# Patient Record
Sex: MALE | Race: WHITE | Employment: FULL TIME | URBAN - METROPOLITAN AREA
[De-identification: names, ages, dates, MRNs, and addresses within clinical notes are randomized per-mention and may not be internally consistent; named-entity substitution may affect disease eponyms.]

---

## 2018-07-12 ENCOUNTER — OFFICE VISIT (OUTPATIENT)
Dept: FAMILY MEDICINE CLINIC | Age: 23
End: 2018-07-12
Payer: COMMERCIAL

## 2018-07-12 VITALS
BODY MASS INDEX: 31.52 KG/M2 | DIASTOLIC BLOOD PRESSURE: 78 MMHG | WEIGHT: 208 LBS | SYSTOLIC BLOOD PRESSURE: 128 MMHG | HEART RATE: 84 BPM | HEIGHT: 68 IN

## 2018-07-12 DIAGNOSIS — Z00.00 ENCOUNTER FOR MEDICAL EXAMINATION TO ESTABLISH CARE: Primary | ICD-10-CM

## 2018-07-12 PROCEDURE — 99385 PREV VISIT NEW AGE 18-39: CPT | Performed by: FAMILY MEDICINE

## 2018-07-12 ASSESSMENT — ENCOUNTER SYMPTOMS
COUGH: 0
ABDOMINAL PAIN: 0
DIARRHEA: 0
CONSTIPATION: 0
EYES NEGATIVE: 1
SHORTNESS OF BREATH: 0

## 2018-07-12 ASSESSMENT — PATIENT HEALTH QUESTIONNAIRE - PHQ9
2. FEELING DOWN, DEPRESSED OR HOPELESS: 0
SUM OF ALL RESPONSES TO PHQ9 QUESTIONS 1 & 2: 0
SUM OF ALL RESPONSES TO PHQ QUESTIONS 1-9: 0
1. LITTLE INTEREST OR PLEASURE IN DOING THINGS: 0

## 2018-07-12 NOTE — PROGRESS NOTES
[facility]  [facility]  [address] [facility]  [address] [facility] [address]  Dept: [phone]    7/12/2018    CHIEF COMPLAINT    Chief Complaint   Patient presents with   [address]    Annual Exam       HPI    Todd Bond is a 26 y.o. male who presents   Chief Complaint   Patient presents with   [address] [facility] Annual Exam   .  Physical exam. NP to get established. Had normal vaccines growing up. No specific health concerns. Currently a student at [employer]. Not exercising regularly but plays tennis when school is in session. REVIEW OF SYSTEMS    Review of Systems   Constitutional: Negative for fever, malaise/fatigue and weight loss. Weight fluctuates   HENT: Negative. Eyes: Negative. Respiratory: Negative for cough and shortness of breath. Cardiovascular: Negative for chest pain, palpitations and leg swelling. Gastrointestinal: Negative for abdominal pain, constipation and diarrhea. Genitourinary: Negative for frequency and urgency. Musculoskeletal: Negative. Skin: Negative. Neurological: Negative for dizziness, sensory change and headaches. Endo/Heme/Allergies: Negative. Psychiatric/Behavioral: Negative for depression. The patient is not nervous/anxious and does not have insomnia. PAST MEDICAL HISTORY    History reviewed. No pertinent past medical history.     FAMILY HISTORY    Family History   Problem Relation Age of Onset    Asthma Mother     High Cholesterol Father        SOCIAL HISTORY    Social History     Social History    Marital status: Single     Spouse name: N/A    Number of children: N/A    Years of education: N/A     Occupational History    [employer]      Social History Main Topics    Smoking status: Never Smoker    Smokeless tobacco: Never Used    Alcohol use Yes      Comment: rarely    Drug use: No    Sexual activity: Yes     Partners: Female     Other Topics Concern   

## 2019-05-30 ENCOUNTER — OFFICE VISIT (OUTPATIENT)
Dept: FAMILY MEDICINE CLINIC | Age: 24
End: 2019-05-30
Payer: COMMERCIAL

## 2019-05-30 VITALS
WEIGHT: 215 LBS | HEIGHT: 68 IN | BODY MASS INDEX: 32.58 KG/M2 | HEART RATE: 80 BPM | DIASTOLIC BLOOD PRESSURE: 76 MMHG | SYSTOLIC BLOOD PRESSURE: 124 MMHG

## 2019-05-30 DIAGNOSIS — R05.8 COUGH PRODUCTIVE OF CLEAR SPUTUM: Primary | ICD-10-CM

## 2019-05-30 DIAGNOSIS — J30.1 SEASONAL ALLERGIC RHINITIS DUE TO POLLEN: ICD-10-CM

## 2019-05-30 PROCEDURE — 1036F TOBACCO NON-USER: CPT | Performed by: FAMILY MEDICINE

## 2019-05-30 PROCEDURE — G8417 CALC BMI ABV UP PARAM F/U: HCPCS | Performed by: FAMILY MEDICINE

## 2019-05-30 PROCEDURE — 99213 OFFICE O/P EST LOW 20 MIN: CPT | Performed by: FAMILY MEDICINE

## 2019-05-30 PROCEDURE — G8427 DOCREV CUR MEDS BY ELIG CLIN: HCPCS | Performed by: FAMILY MEDICINE

## 2019-05-30 RX ORDER — MONTELUKAST SODIUM 10 MG/1
10 TABLET ORAL NIGHTLY
Qty: 30 TABLET | Refills: 5 | Status: SHIPPED | OUTPATIENT
Start: 2019-05-30 | End: 2019-11-04 | Stop reason: ALTCHOICE

## 2019-05-30 ASSESSMENT — ENCOUNTER SYMPTOMS
ABDOMINAL PAIN: 0
COUGH: 1
WHEEZING: 1
SHORTNESS OF BREATH: 0
EYES NEGATIVE: 1

## 2019-05-30 ASSESSMENT — PATIENT HEALTH QUESTIONNAIRE - PHQ9
SUM OF ALL RESPONSES TO PHQ9 QUESTIONS 1 & 2: 0
1. LITTLE INTEREST OR PLEASURE IN DOING THINGS: 0
2. FEELING DOWN, DEPRESSED OR HOPELESS: 0
SUM OF ALL RESPONSES TO PHQ QUESTIONS 1-9: 0
SUM OF ALL RESPONSES TO PHQ QUESTIONS 1-9: 0

## 2019-05-30 NOTE — PATIENT INSTRUCTIONS
Patient Education        Managing Your Allergies: Care Instructions  Your Care Instructions    Managing your allergies is an important part of staying healthy. Your doctor will help you find out what may be causing the allergies. Common causes of allergy symptoms are house dust and dust mites, animal dander, mold, and pollen. As soon as you know what triggers your symptoms, try to reduce your exposure to your triggers. This can help prevent allergy symptoms, asthma, and other health problems. Ask your doctor about allergy medicine or immunotherapy. These treatments may help reduce or prevent allergy symptoms. Follow-up care is a key part of your treatment and safety. Be sure to make and go to all appointments, and call your doctor if you are having problems. It's also a good idea to know your test results and keep a list of the medicines you take. How can you care for yourself at home? · If you think that dust or dust mites are causing your allergies:  ? Wash sheets, pillowcases, and other bedding every week in hot water. ? Use airtight, dust-proof covers for pillows, duvets, and mattresses. Avoid plastic covers, because they tend to tear quickly and do not \"breathe. \" Wash according to the instructions. ? Remove extra blankets and pillows that you don't need. ? Use blankets that are machine-washable. ? Don't use home humidifiers. They can help mites live longer. · Use air-conditioning. Change or clean all filters every month. Keep windows closed. Use high-efficiency air filters. Don't use window or attic fans, which draw dust into the air. · If you're allergic to pet dander, keep pets outside or, at the very least, out of your bedroom. Old carpet and cloth-covered furniture can hold a lot of animal dander. You may need to replace them. · Look for signs of cockroaches. Use cockroach baits to get rid of them. Then clean your home well.   · If you're allergic to mold, don't keep indoor plants, because molds can grow in soil. Get rid of furniture, rugs, and drapes that smell musty. Check for mold in the bathroom. · If you're allergic to pollen, stay inside when pollen counts are high. · Don't smoke or let anyone else smoke in your house. Don't use fireplaces or wood-burning stoves. Avoid paint fumes, perfumes, and other strong odors. When should you call for help? Give an epinephrine shot if:    · You think you are having a severe allergic reaction.    After giving an epinephrine shot call 911, even if you feel better.   Call 911 if:    · You have symptoms of a severe allergic reaction. These may include:  ? Sudden raised, red areas (hives) all over your body. ? Swelling of the throat, mouth, lips, or tongue. ? Trouble breathing. ? Passing out (losing consciousness). Or you may feel very lightheaded or suddenly feel weak, confused, or restless.     · You have been given an epinephrine shot, even if you feel better.    Call your doctor now or seek immediate medical care if:    · You have symptoms of an allergic reaction, such as:  ? A rash or hives (raised, red areas on the skin). ? Itching. ? Swelling. ? Belly pain, nausea, or vomiting.    Watch closely for changes in your health, and be sure to contact your doctor if:    · Your allergies get worse.     · You need help controlling your allergies.     · You have questions about allergy testing.     · You do not get better as expected. Where can you learn more? Go to [email or web address] org and sign in to your Hark account. Enter L249 in the [facility] box to learn more about \"Managing Your Allergies: Care Instructions. \"     If you do not have an account, please click on the \"Sign Up Now\" link. Current as of: June 27, 2018  Content Version: 12.0  © 4567-1358 Healthwise, Incorporated. Care instructions adapted under license by [facility] ([facility]).  If you have questions about a medical condition or this instruction, always ask doctor if you have:  · asthma, or a history of severe allergic reaction to aspirin. The chewable tablet may contain phenylalanine. Talk to your doctor before using this form of montelukast if you have phenylketonuria (PKU). Montelukast is not expected to be harmful to an unborn baby. Tell your doctor if you are pregnant or plan to become pregnant during treatment. It is not known whether montelukast passes into breast milk or if it could harm a nursing baby. Tell your doctor if you are breast-feeding a baby. How should I take montelukast?  Follow all directions on your prescription label. Do not take this medicine in larger or smaller amounts or for longer than recommended. Montelukast is usually taken once daily in the evening for prevention of asthma or allergy symptoms. For exercise-induced bronchoconstriction, take a single dose at least 2 hours before you exercise, and do not take another dose for at least 24 hours. Follow your doctor's instructions. Montelukast is not a rescue medicine. It will not work fast enough to treat an asthma attack. Use only a fast acting inhalation medicine for an asthma attack. Tell your doctor if it seems like your asthma medications don't work as well. Swallow the regular tablet  whole, with a glass of water. The chewable tablet  must be chewed completely before you swallow it. The oral granules  can be placed directly into the mouth and swallowed, or mixed with a spoonful of applesauce, mashed carrots, rice, or ice cream. Oral granules can also be mixed with 1 teaspoon of baby formula or breast milk. Do not use any other type of liquid for mixing the granules. Other liquids can be taken before or after taking the medicine. After opening or mixing the oral granules, you must use them within 15 minutes. Do not save an open packet or mixed medicine for later use. It may take up to several weeks before your symptoms improve.  Keep using the medication as directed and tell your doctor if your symptoms do not improve after several weeks of treatment. If you also take a steroid asthma medicine, do not stop using it suddenly without first talking to your doctor. You may need to use less and less before you stop the medication completely. Asthma is usually treated with a combination of drugs. Use all medications as directed by your doctor. Read the medication guide or patient instructions provided with each medication. Do not change your doses or medication schedule without your doctor's advice, even if you have no asthma symptoms. Store at room temperature away from moisture and heat. Do not open a packet of oral granules until you are ready to use the medicine. What happens if I miss a dose? Take the missed dose as soon as you remember. Skip the missed dose if it is almost time for your next scheduled dose. Do not  take extra medicine to make up the missed dose. What happens if I overdose? Seek emergency medical attention or call the Poison Help line at 1-516.616.3957. What should I avoid while taking montelukast?  Avoid situations or activities that may trigger an asthma attack. If your asthma symptoms get worse when you take aspirin, avoid taking aspirin or other NSAIDs (nonsteroidal anti-inflammatory drugs) while you are taking montelukast. NSAIDs include ibuprofen (Advil, Motrin), naproxen (Aleve), celecoxib, diclofenac, indomethacin, meloxicam, and others. What are the possible side effects of montelukast?  Get emergency medical help if you have signs of an allergic reaction:  hives; difficulty breathing; swelling of your face, lips, tongue, or throat.   Call your doctor at once if you have:  · unusual changes in mood or behavior;  · skin rash, bruising, severe tingling, numbness, pain, muscle weakness;  · ear pain, swelling, or warmth; or  · severe skin reaction --fever, sore throat, swelling in your face or tongue, burning in your eyes, skin pain, followed by a red or purple skin rash that spreads (especially in the face or upper body) and causes blistering and peeling. Common side effects may include:  · stomach pain, diarrhea;  · fever or other flu symptoms;  · cold symptoms such as stuffy nose, sinus pain, cough, sore throat;  · headache; or  · bed-wetting or loss of bladder control in children. This is not a complete list of side effects and others may occur. Call your doctor for medical advice about side effects. You may report side effects to FDA at 4-824-FDA-9918. What other drugs will affect montelukast?  Other drugs may interact with montelukast, including prescription and over-the-counter medicines, vitamins, and herbal products. Tell each of your health care providers about all medicines you use now and any medicine you start or stop using. Where can I get more information? Your pharmacist can provide more information about montelukast.  Remember, keep this and all other medicines out of the reach of children, never share your medicines with others, and use this medication only for the indication prescribed. Every effort has been made to ensure that the information provided by [address] is accurate, up-to-date, and complete, but no guarantee is made to that effect. Drug information contained herein may be time sensitive. [employer] information has been compiled for use by healthcare practitioners and consumers in the [place] and therefore "[employer] does not warrant that uses outside of the [place] are appropriate, unless specifically indicated otherwise. [facility]'s drug information does not endorse drugs, diagnose patients or recommend therapy.  [employer] drug information is an informational resource designed to assist licensed healthcare practitioners in caring for their patients and/or to serve consumers viewing this service as a supplement to, and not a substitute for, the expertise, skill, knowledge and judgment of healthcare

## 2019-05-30 NOTE — PROGRESS NOTES
[facility] & [facility]  [facility]  [address] [facility]  [address] [facility] [address]  Dept: [phone]    5/30/2019    CHIEF COMPLAINT    Chief Complaint   Patient presents with    Cough    Mass     face       HPI    Janette Mcclure is a 22 y.o. male who presents   Chief Complaint   Patient presents with    Cough    Mass     face   . Has a cough every year, usually in the winter. Current cough started 5-6 weeks ago. Raised lesion left cheek for past year, not changing. Cough   This is a recurrent problem. The current episode started more than 1 month ago. The problem has been waxing and waning. The problem occurs hourly. The cough is productive of sputum. Associated symptoms include postnasal drip and wheezing (mild, occasional). Pertinent negatives include no chest pain, fever, headaches or shortness of breath. The symptoms are aggravated by pollens. He has tried OTC cough suppressant for the symptoms. The treatment provided mild relief. His past medical history is significant for environmental allergies. There is no history of asthma. Vitals:    05/30/19 0805   BP: 124/76   Pulse: 80   Weight: 215 lb (97.5 kg)   Height: 5' 8\" (1.727 m)       REVIEW OF SYSTEMS    Review of Systems   Constitutional: Negative for fever. HENT: Positive for postnasal drip. Eyes: Negative. Respiratory: Positive for cough and wheezing (mild, occasional). Negative for shortness of breath. Cardiovascular: Negative for chest pain and leg swelling. Gastrointestinal: Negative for abdominal pain. Genitourinary: Negative for frequency and urgency. Musculoskeletal: Negative. Skin: Negative. Allergic/Immunologic: Positive for environmental allergies. Neurological: Negative for dizziness and headaches. Psychiatric/Behavioral: The patient is not nervous/anxious.         PAST MEDICAL HISTORY    Past Medical History:   Diagnosis Date    Seasonal allergic rhinitis due to pollen 5/30/2019       FAMILY HISTORY    Family History   Problem Relation Age of Onset    Asthma Mother     High Cholesterol Father        SOCIAL HISTORY    Social History     Socioeconomic History    Marital status: Single     Spouse name: None    Number of children: None    Years of education: None    Highest education level: None   Occupational History    Occupation: [employer]   Social Needs    Financial resource strain: None    Food insecurity:     Worry: None     Inability: None    Transportation needs:     Medical: None     Non-medical: None   Tobacco Use    Smoking status: Never Smoker    Smokeless tobacco: Never Used   Substance and Sexual Activity    Alcohol use: Yes     Comment: rarely    Drug use: No    Sexual activity: Yes     Partners: Female   Lifestyle    Physical activity:     Days per week: None     Minutes per session: None    Stress: None   Relationships    Social connections:     Talks on phone: None     Gets together: None     Attends Yarsanism service: None     Active member of club or organization: None     Attends meetings of clubs or organizations: None     Relationship status: None    Intimate partner violence:     Fear of current or ex partner: None     Emotionally abused: None     Physically abused: None     Forced sexual activity: None   Other Topics Concern    None   Social History Narrative    None       SURGICAL HISTORY    History reviewed. No pertinent surgical history. CURRENT MEDICATIONS    Current Outpatient Medications   Medication Sig Dispense Refill    montelukast (SINGULAIR) 10 MG tablet Take 1 tablet by mouth nightly 30 tablet 5     No current facility-administered medications for this visit. ALLERGIES    No Known Allergies    PHYSICAL EXAM   Physical Exam   Constitutional: He is oriented to person, place, and time. He appears well-developed and well-nourished. HENT:   Head: Normocephalic. Nose: Mucosal edema present.  Right sinus exhibits no maxillary sinus tenderness and no frontal sinus tenderness. Left sinus exhibits no maxillary sinus tenderness and no frontal sinus tenderness. Mouth/Throat: Mucous membranes are normal. Posterior oropharyngeal edema present. Eyes: Pupils are equal, round, and reactive to light. Conjunctivae are normal.   Neck: Normal range of motion. Neck supple. No thyromegaly present. Cardiovascular: Normal rate, regular rhythm and normal heart sounds. No murmur heard. Pulmonary/Chest: Effort normal and breath sounds normal. He has no wheezes. He has no rales. Abdominal: Soft. There is no tenderness. There is no rebound and no guarding. Musculoskeletal: Normal range of motion. He exhibits no edema, tenderness or deformity. Lymphadenopathy:     He has no cervical adenopathy. Neurological: He is alert and oriented to person, place, and time. Skin: Skin is warm and dry. Psychiatric: He has a normal mood and affect. His behavior is normal.   Vitals reviewed. Assessment/PLan  1. Seasonal allergic rhinitis due to pollen  Take otc antihistamine. Saline spray prn  - montelukast (SINGULAIR) 10 MG tablet; Take 1 tablet by mouth nightly  Dispense: 30 tablet; Refill: 5    2. Cough productive of clear sputum  As above. Call if worse or not improving. Hydrate. Adam Gutting received counseling on the following healthy behaviors: nutrition, exercise and medication adherence  Reviewed prior labs and health maintenance. Continue current medications, diet and exercise. Discussed use, benefit, and side effects of prescribed medications. Barriers to medication compliance addressed. Patient given educational materials - see patient instructions. All patient questions answered. Patient voiced understanding. Return if symptoms worsen or fail to improve.         Electronically signed by Elia Gaytan MD on 5/30/19 at 8:11 AM

## 2019-11-04 ENCOUNTER — HOSPITAL ENCOUNTER (OUTPATIENT)
Age: 24
Setting detail: SPECIMEN
Discharge: HOME | End: 2019-11-04
Payer: COMMERCIAL

## 2019-11-04 ENCOUNTER — OFFICE VISIT (OUTPATIENT)
Dept: FAMILY MEDICINE CLINIC | Age: 24
End: 2019-11-04
Payer: COMMERCIAL

## 2019-11-04 VITALS
TEMPERATURE: 99 F | HEART RATE: 100 BPM | DIASTOLIC BLOOD PRESSURE: 78 MMHG | WEIGHT: 220.5 LBS | BODY MASS INDEX: 33.53 KG/M2 | SYSTOLIC BLOOD PRESSURE: 126 MMHG

## 2019-11-04 DIAGNOSIS — J35.8 ASYMMETRIC TONSILS: ICD-10-CM

## 2019-11-04 DIAGNOSIS — J35.01 CHRONIC TONSILLITIS: Primary | ICD-10-CM

## 2019-11-04 DIAGNOSIS — J35.01 CHRONIC TONSILLITIS: ICD-10-CM

## 2019-11-04 LAB — S PYO AG THROAT QL: NORMAL

## 2019-11-04 PROCEDURE — 1036F TOBACCO NON-USER: CPT | Performed by: FAMILY MEDICINE

## 2019-11-04 PROCEDURE — 99213 OFFICE O/P EST LOW 20 MIN: CPT | Performed by: FAMILY MEDICINE

## 2019-11-04 PROCEDURE — G8484 FLU IMMUNIZE NO ADMIN: HCPCS | Performed by: FAMILY MEDICINE

## 2019-11-04 PROCEDURE — G8417 CALC BMI ABV UP PARAM F/U: HCPCS | Performed by: FAMILY MEDICINE

## 2019-11-04 PROCEDURE — 87880 STREP A ASSAY W/OPTIC: CPT | Performed by: FAMILY MEDICINE

## 2019-11-04 PROCEDURE — G8427 DOCREV CUR MEDS BY ELIG CLIN: HCPCS | Performed by: FAMILY MEDICINE

## 2019-11-04 ASSESSMENT — ENCOUNTER SYMPTOMS
NAUSEA: 0
EYES NEGATIVE: 1
SORE THROAT: 1
SWOLLEN GLANDS: 1
COUGH: 0

## 2019-11-06 LAB
CULTURE: NORMAL
Lab: NORMAL
SPECIMEN DESCRIPTION: NORMAL

## 2020-01-16 ENCOUNTER — OFFICE VISIT (OUTPATIENT)
Dept: FAMILY MEDICINE CLINIC | Age: 25
End: 2020-01-16
Payer: COMMERCIAL

## 2020-01-16 VITALS
TEMPERATURE: 99.3 F | WEIGHT: 225 LBS | DIASTOLIC BLOOD PRESSURE: 86 MMHG | BODY MASS INDEX: 34.1 KG/M2 | HEART RATE: 100 BPM | SYSTOLIC BLOOD PRESSURE: 128 MMHG | HEIGHT: 68 IN

## 2020-01-16 PROCEDURE — 99213 OFFICE O/P EST LOW 20 MIN: CPT | Performed by: FAMILY MEDICINE

## 2020-01-16 RX ORDER — CYCLOBENZAPRINE HCL 10 MG
10 TABLET ORAL 3 TIMES DAILY PRN
Qty: 21 TABLET | Refills: 0 | Status: SHIPPED | OUTPATIENT
Start: 2020-01-16 | End: 2020-01-26

## 2020-01-16 RX ORDER — METHYLPREDNISOLONE 4 MG/1
TABLET ORAL
Qty: 1 KIT | Refills: 0 | Status: SHIPPED | OUTPATIENT
Start: 2020-01-16 | End: 2020-06-02

## 2020-01-16 ASSESSMENT — PATIENT HEALTH QUESTIONNAIRE - PHQ9
2. FEELING DOWN, DEPRESSED OR HOPELESS: 0
1. LITTLE INTEREST OR PLEASURE IN DOING THINGS: 0
SUM OF ALL RESPONSES TO PHQ QUESTIONS 1-9: 0
SUM OF ALL RESPONSES TO PHQ QUESTIONS 1-9: 0
SUM OF ALL RESPONSES TO PHQ9 QUESTIONS 1 & 2: 0

## 2020-01-16 ASSESSMENT — ENCOUNTER SYMPTOMS
COUGH: 1
BACK PAIN: 1

## 2020-01-16 NOTE — LETTER
[address] [facility]  [address] [address]  Phone: [phone]  Fax: [phone] [address] Sandra Hernandez MD        January 16, 2020     Patient: Jack Pulido   YOB: 1995   Date of Visit: 1/16/2020       To Whom It May Concern: It is my medical opinion that Jack Pulido should remain out of work until 1/19/2020. Patient is ok to returm to work on 1/20/2020. If you have any questions or concerns, please don't hesitate to call.     Sincerely,        [address] Sandra Hernandez MD

## 2020-01-16 NOTE — PROGRESS NOTES
[facility]  [facility]  [address]  [facility] [address]  Dept: [phone]    1/16/2020    CHIEF COMPLAINT    Chief Complaint   Patient presents with    Back Pain    Generalized Body Aches       HPI    Colt Mckeon is a 22 y.o. male who presents   Chief Complaint   Patient presents with    Back Pain    Generalized Body Aches   . Back Pain   This is a new problem. The current episode started in the past 7 days. The problem occurs constantly. The pain is present in the lumbar spine. The quality of the pain is described as aching. The pain does not radiate. The symptoms are aggravated by bending. He has tried NSAIDs, ice and heat for the symptoms. The treatment provided mild relief. Generalized Body Aches   This is a new problem. The current episode started yesterday. Associated symptoms include arthralgias, congestion, coughing, fatigue and myalgias. The symptoms are aggravated by bending. Vitals:    01/16/20 1135   BP: 128/86   Pulse: 100   Temp: 99.3 °F (37.4 °C)   Weight: 225 lb (102.1 kg)   Height: 5' 8\" (1.727 m)       REVIEW OF SYSTEMS    Review of Systems   Constitutional: Positive for fatigue. HENT: Positive for congestion. Respiratory: Positive for cough. Musculoskeletal: Positive for arthralgias, back pain and myalgias.        PAST MEDICAL HISTORY    Past Medical History:   Diagnosis Date    Seasonal allergic rhinitis due to pollen 5/30/2019       FAMILY HISTORY    Family History   Problem Relation Age of Onset    Asthma Mother     High Cholesterol Father        SOCIAL HISTORY    Social History     Socioeconomic History    Marital status: Single     Spouse name: None    Number of children: None    Years of education: None    Highest education level: None   Occupational History    Occupation: [employer]   Social Needs    Financial resource strain: None    Food insecurity:     Worry: None     Inability:

## 2020-06-02 ENCOUNTER — NURSE TRIAGE (OUTPATIENT)
Dept: OTHER | Facility: CLINIC | Age: 25
End: 2020-06-02

## 2020-06-02 ENCOUNTER — OFFICE VISIT (OUTPATIENT)
Dept: PRIMARY CARE CLINIC | Age: 25
End: 2020-06-02
Payer: COMMERCIAL

## 2020-06-02 VITALS
HEART RATE: 94 BPM | BODY MASS INDEX: 34.1 KG/M2 | DIASTOLIC BLOOD PRESSURE: 88 MMHG | OXYGEN SATURATION: 98 % | HEIGHT: 68 IN | SYSTOLIC BLOOD PRESSURE: 120 MMHG | RESPIRATION RATE: 18 BRPM | WEIGHT: 225 LBS

## 2020-06-02 PROCEDURE — 99213 OFFICE O/P EST LOW 20 MIN: CPT | Performed by: NURSE PRACTITIONER

## 2020-06-02 RX ORDER — AMOXICILLIN AND CLAVULANATE POTASSIUM 875; 125 MG/1; MG/1
1 TABLET, FILM COATED ORAL 2 TIMES DAILY
Qty: 20 TABLET | Refills: 0 | Status: SHIPPED | OUTPATIENT
Start: 2020-06-02 | End: 2020-06-12

## 2020-06-02 RX ORDER — CETIRIZINE HYDROCHLORIDE 10 MG/1
10 TABLET ORAL DAILY
Qty: 14 TABLET | Refills: 0 | Status: SHIPPED | OUTPATIENT
Start: 2020-06-02 | End: 2020-06-16

## 2020-06-02 ASSESSMENT — ENCOUNTER SYMPTOMS
SHORTNESS OF BREATH: 0
VOMITING: 0
COUGH: 0
SORE THROAT: 0
COLOR CHANGE: 0
CHEST TIGHTNESS: 0
ABDOMINAL PAIN: 0
DIARRHEA: 0
RHINORRHEA: 0
NAUSEA: 0

## 2020-06-02 NOTE — PATIENT INSTRUCTIONS
changes in your health, and be sure to contact your doctor if:  · You have new or worse symptoms. · You are not getting better after taking an antibiotic for 2 days. Where can you learn more? Go to [email or web address]. [email or web address] and sign in to your LoggedIn account. Enter Y012 in the [employer] box to learn more about \"Ear Infection (Otitis Media): Care Instructions. \"     If you do not have an account, please click on the \"Sign Up Now\" link. Current as of: July 29, 2019               Content Version: 12.5  © 1178-0585 Healthwise, Incorporated. Care instructions adapted under license by [facility] ([facility]). If you have questions about a medical condition or this instruction, always ask your healthcare professional. [address] any warranty or liability for your use of this information.

## 2020-06-11 ENCOUNTER — OFFICE VISIT (OUTPATIENT)
Dept: PRIMARY CARE CLINIC | Age: 25
End: 2020-06-11
Payer: COMMERCIAL

## 2020-06-11 VITALS
BODY MASS INDEX: 35.73 KG/M2 | DIASTOLIC BLOOD PRESSURE: 70 MMHG | RESPIRATION RATE: 12 BRPM | WEIGHT: 235 LBS | OXYGEN SATURATION: 98 % | HEART RATE: 92 BPM | SYSTOLIC BLOOD PRESSURE: 118 MMHG

## 2020-06-11 PROCEDURE — 99213 OFFICE O/P EST LOW 20 MIN: CPT | Performed by: NURSE PRACTITIONER

## 2020-06-11 RX ORDER — FLUTICASONE PROPIONATE 50 MCG
2 SPRAY, SUSPENSION (ML) NASAL DAILY
Qty: 1 BOTTLE | Refills: 1 | Status: SHIPPED | OUTPATIENT
Start: 2020-06-11 | End: 2020-10-21

## 2020-06-11 RX ORDER — CETIRIZINE HYDROCHLORIDE, PSEUDOEPHEDRINE HYDROCHLORIDE 5; 120 MG/1; MG/1
1 TABLET, FILM COATED, EXTENDED RELEASE ORAL 2 TIMES DAILY
Qty: 14 TABLET | Refills: 0 | Status: SHIPPED | OUTPATIENT
Start: 2020-06-11 | End: 2020-06-18

## 2020-06-11 ASSESSMENT — ENCOUNTER SYMPTOMS
SWOLLEN GLANDS: 0
SINUS PAIN: 0
RHINORRHEA: 0
TROUBLE SWALLOWING: 0
COUGH: 0
COLOR CHANGE: 0
SINUS PRESSURE: 1
VOMITING: 0
ABDOMINAL PAIN: 0
HOARSE VOICE: 0
SHORTNESS OF BREATH: 0
NAUSEA: 0
DIARRHEA: 0
SORE THROAT: 0
CHEST TIGHTNESS: 0

## 2020-06-11 NOTE — PROGRESS NOTES
and neck pain. Skin: Negative for color change. Neurological: Negative for weakness and headaches. Psychiatric/Behavioral: Negative for behavioral problems. The patient is not nervous/anxious. All other systems reviewed and are negative. Objective:   /70 (Site: Right Upper Arm, Position: Sitting)   Pulse 92   Resp 12   Wt 235 lb (106.6 kg)   SpO2 98%   BMI 35.73 kg/m²   Physical Exam  Vitals signs reviewed. Constitutional:       General: He is not in acute distress. Appearance: Normal appearance. HENT:      Head: Normocephalic. Right Ear: Tympanic membrane, ear canal and external ear normal.      Left Ear: Tympanic membrane, ear canal and external ear normal.      Nose: Nose normal.      Right Sinus: No maxillary sinus tenderness or frontal sinus tenderness. Left Sinus: No maxillary sinus tenderness or frontal sinus tenderness. Mouth/Throat:      Pharynx: Oropharynx is clear. Eyes:      Pupils: Pupils are equal, round, and reactive to light. Neck:      Musculoskeletal: Normal range of motion and neck supple. Cardiovascular:      Rate and Rhythm: Normal rate and regular rhythm. Pulses: Normal pulses. Heart sounds: Normal heart sounds. Pulmonary:      Effort: Pulmonary effort is normal. No respiratory distress. Breath sounds: Normal breath sounds. No wheezing or rhonchi. Abdominal:      General: Bowel sounds are normal.   Musculoskeletal: Normal range of motion. Lymphadenopathy:      Cervical: No cervical adenopathy. Skin:     General: Skin is warm and dry. Capillary Refill: Capillary refill takes less than 2 seconds. Neurological:      Mental Status: He is alert and oriented to person, place, and time. Psychiatric:         Mood and Affect: Mood normal.         Behavior: Behavior normal.         Thought Content: Thought content normal.         Judgment: Judgment normal.           :       Diagnosis Orders   1.  Sinus congestion

## 2020-06-11 NOTE — PATIENT INSTRUCTIONS
hot, wet towel or a warm gel pack on your face 3 or 4 times a day for 5 to 10 minutes each time. · Try a decongestant nasal spray like oxymetazoline (Afrin). Do not use it for more than 3 days in a row. Using it for more than 3 days can make your congestion worse. When should you call for help? Call your doctor now or seek immediate medical care if:  · You have new or worse swelling or redness in your face or around your eyes. · You have a new or higher fever. Watch closely for changes in your health, and be sure to contact your doctor if:  · You have new or worse facial pain. · The mucus from your nose becomes thicker (like pus) or has new blood in it. · You are not getting better as expected. Where can you learn more? Go to [email or web address] and sign in to your BoardVitals account. Enter X496 in the Metafused box to learn more about \"Sinusitis: Care Instructions. \"     If you do not have an account, please click on the \"Sign Up Now\" link. Current as of: July 29, 2019               Content Version: 12.5  © 0852-5434 Healthwise, Incorporated. Care instructions adapted under license by [facility] ([facility]). If you have questions about a medical condition or this instruction, always ask your healthcare professional. [address] any warranty or liability for your use of this information.

## 2020-06-19 ENCOUNTER — OFFICE VISIT (OUTPATIENT)
Dept: PRIMARY CARE CLINIC | Age: 25
End: 2020-06-19
Payer: COMMERCIAL

## 2020-06-19 VITALS
TEMPERATURE: 98 F | SYSTOLIC BLOOD PRESSURE: 126 MMHG | DIASTOLIC BLOOD PRESSURE: 76 MMHG | WEIGHT: 234.2 LBS | OXYGEN SATURATION: 98 % | BODY MASS INDEX: 35.61 KG/M2 | HEART RATE: 98 BPM | RESPIRATION RATE: 16 BRPM

## 2020-06-19 PROCEDURE — 96372 THER/PROPH/DIAG INJ SC/IM: CPT | Performed by: INTERNAL MEDICINE

## 2020-06-19 PROCEDURE — 99213 OFFICE O/P EST LOW 20 MIN: CPT | Performed by: INTERNAL MEDICINE

## 2020-06-19 RX ORDER — METHYLPREDNISOLONE ACETATE 40 MG/ML
40 INJECTION, SUSPENSION INTRA-ARTICULAR; INTRALESIONAL; INTRAMUSCULAR; SOFT TISSUE ONCE
Status: COMPLETED | OUTPATIENT
Start: 2020-06-19 | End: 2020-06-19

## 2020-06-19 RX ORDER — PREDNISONE 20 MG/1
TABLET ORAL
Qty: 18 TABLET | Refills: 0 | Status: SHIPPED | OUTPATIENT
Start: 2020-06-19 | End: 2020-06-29

## 2020-06-19 RX ORDER — TIZANIDINE 4 MG/1
4 TABLET ORAL NIGHTLY PRN
Qty: 20 TABLET | Refills: 0 | Status: SHIPPED | OUTPATIENT
Start: 2020-06-19 | End: 2020-10-21 | Stop reason: ALTCHOICE

## 2020-06-19 RX ADMIN — METHYLPREDNISOLONE ACETATE 40 MG: 40 INJECTION, SUSPENSION INTRA-ARTICULAR; INTRALESIONAL; INTRAMUSCULAR; SOFT TISSUE at 15:33

## 2020-06-27 ASSESSMENT — ENCOUNTER SYMPTOMS
ABDOMINAL PAIN: 0
SINUS PRESSURE: 0
BACK PAIN: 1
VOMITING: 0
WHEEZING: 0
NAUSEA: 0
CONSTIPATION: 0
SORE THROAT: 0
ABDOMINAL DISTENTION: 0
COUGH: 0
SHORTNESS OF BREATH: 0

## 2020-06-28 NOTE — PROGRESS NOTES
[facility] PRIMARY CARE  [facility] [address] [facility] [address]  [address] [address]  Dept: [phone]  Dept Fax: [phone]    Nidia Saab is a 26 y.o. male who presents today for his medical conditions/complaints as noted below. Chief Complaint   Patient presents with    Back Pain     Mid X 4 days and muscle spasms       HPI:     This is a 80-year-old male who is here for mid back pain. This has been going on for past 4 days and there muscle spasm this started after lifting some heavy weights. No other complaints or concerns associated. No results found for: LABA1C          ( goal A1C is < 7)   No results found for: LABMICR  No results found for: LDLCHOLESTEROL, LDLCALC    (goal LDL is <100)   No results found for: AST, ALT, BUN  BP Readings from Last 3 Encounters:   20 126/76   20 118/70   20 120/88          (goal 120/80)    Past Medical History:   Diagnosis Date    Seasonal allergic rhinitis due to pollen 2019      History reviewed. No pertinent surgical history. Family History   Problem Relation Age of Onset    Asthma Mother     High Cholesterol Father        Social History     Tobacco Use    Smoking status: Never Smoker    Smokeless tobacco: Never Used   Substance Use Topics    Alcohol use: Yes     Comment: rarely      Current Outpatient Medications   Medication Sig Dispense Refill    predniSONE (DELTASONE) 20 MG tablet 3 tabs x 3 days, then 2 tabs x 3 days, then 1 tab x 3 days 18 tablet 0    tiZANidine (ZANAFLEX) 4 MG tablet Take 1 tablet by mouth nightly as needed (muscle spasm) 20 tablet 0    fluticasone (FLONASE) 50 MCG/ACT nasal spray 2 sprays by Each Nostril route daily 1 Bottle 1     No current facility-administered medications for this visit.       No Known Allergies    Health Maintenance   Topic Date Due    HPV vaccine (1 - Male 2-dose series) 2007    Varicella vaccine (2 of 2 - 13+ 2-dose series) 2010    Trachea: Phonation normal.   Cardiovascular:      Rate and Rhythm: Normal rate and regular rhythm. Pulses: Normal pulses. Carotid pulses are 2+ on the right side and 2+ on the left side. Radial pulses are 2+ on the right side and 2+ on the left side. Heart sounds: Normal heart sounds. No murmur. Pulmonary:      Effort: Pulmonary effort is normal. No accessory muscle usage or respiratory distress. Breath sounds: Normal breath sounds. No wheezing or rales. Abdominal:      General: Bowel sounds are normal. There is no distension. Palpations: Abdomen is soft. Tenderness: There is no abdominal tenderness. There is no rebound. Musculoskeletal: Normal range of motion. General: No deformity. Lymphadenopathy:      Cervical: No cervical adenopathy. Skin:     General: Skin is warm. Capillary Refill: Capillary refill takes less than 2 seconds. Findings: No rash. Nails: There is no clubbing. Neurological:      Mental Status: He is alert and oriented to person, place, and time. Sensory: No sensory deficit. Psychiatric:         Speech: Speech normal.         Behavior: Behavior normal.       /76   Pulse 98   Temp 98 °F (36.7 °C) (Temporal)   Resp 16   Wt 234 lb 3.2 oz (106.2 kg)   SpO2 98%   BMI 35.61 kg/m²     Assessment:          1. Spasm of thoracic back muscle    - predniSONE (DELTASONE) 20 MG tablet; 3 tabs x 3 days, then 2 tabs x 3 days, then 1 tab x 3 days  Dispense: 18 tablet; Refill: 0  - tiZANidine (ZANAFLEX) 4 MG tablet; Take 1 tablet by mouth nightly as needed (muscle spasm)  Dispense: 20 tablet; Refill: 0  - methylPREDNISolone acetate (DEPO-MEDROL) injection 40 mg            Diagnosis Orders   1.  Spasm of thoracic back muscle  predniSONE (DELTASONE) 20 MG tablet    tiZANidine (ZANAFLEX) 4 MG tablet    methylPREDNISolone acetate (DEPO-MEDROL) injection 40 mg           Plan:      Return in about 4 weeks (around 7/17/2020) for

## 2020-10-21 ENCOUNTER — OFFICE VISIT (OUTPATIENT)
Dept: PRIMARY CARE CLINIC | Age: 25
End: 2020-10-21
Payer: COMMERCIAL

## 2020-10-21 VITALS
BODY MASS INDEX: 36.04 KG/M2 | SYSTOLIC BLOOD PRESSURE: 114 MMHG | WEIGHT: 237 LBS | HEART RATE: 103 BPM | DIASTOLIC BLOOD PRESSURE: 64 MMHG | TEMPERATURE: 97.7 F | OXYGEN SATURATION: 97 %

## 2020-10-21 PROCEDURE — 99214 OFFICE O/P EST MOD 30 MIN: CPT | Performed by: PHYSICIAN ASSISTANT

## 2020-10-21 RX ORDER — AMOXICILLIN AND CLAVULANATE POTASSIUM 875; 125 MG/1; MG/1
1 TABLET, FILM COATED ORAL 2 TIMES DAILY
Qty: 20 TABLET | Refills: 0 | Status: SHIPPED | OUTPATIENT
Start: 2020-10-21 | End: 2020-10-31

## 2020-10-21 RX ORDER — CETIRIZINE HYDROCHLORIDE, PSEUDOEPHEDRINE HYDROCHLORIDE 5; 120 MG/1; MG/1
1 TABLET, FILM COATED, EXTENDED RELEASE ORAL 2 TIMES DAILY
Qty: 20 TABLET | Refills: 0 | Status: SHIPPED | OUTPATIENT
Start: 2020-10-21 | End: 2020-11-20

## 2020-10-21 ASSESSMENT — ENCOUNTER SYMPTOMS
RHINORRHEA: 1
BACK PAIN: 0
SINUS PRESSURE: 1
VOMITING: 0
ABDOMINAL PAIN: 0
EYE PAIN: 0
NAUSEA: 0
DIARRHEA: 0
SHORTNESS OF BREATH: 0
SINUS PAIN: 1
COUGH: 1
CONSTIPATION: 0

## 2020-10-21 NOTE — PROGRESS NOTES
[address] [place] [address] CARE  [facility] [address]  [address] [address]  Dept: [phone]  Dept Fax: [phone]    Jv Cee is a 26 y.o. male who presents today for his medical conditions/complaints as noted below. Chief Complaint   Patient presents with    Cough     deep phlegmy cough, makes him have trouble breathing, this happens every october to march, post nasal drip making him nauseated       HPI:     Patient presents to the office for evaluation of uri symptoms. These symptoms have been present for around 3 weeks. He admits to wet cough, sinus pressure/pain, ear pressure, post nasal drainage. Denies shortness of breath, chest pain, abdominal pain, nausea, muscle aches, fever, chills. This is a recurrent problem for the patient. He describes several years worth of upper respiratory difficulties which are related to the change of fall season. He was seen by [facility] in June and treated for similar symptoms. He says his symptoms are worse now. He does have history of seasonal allergies. No diagnosis of asthma. No prior ENT or allergist.      No results found for: LABA1C          ( goal A1C is < 7)   No results found for: LABMICR  No results found for: LDLCHOLESTEROL, LDLCALC    (goal LDL is <100)   No results found for: AST, ALT, BUN  BP Readings from Last 3 Encounters:   10/21/20 114/64   06/19/20 126/76   06/11/20 118/70          (goal 120/80)    Past Medical History:   Diagnosis Date    Seasonal allergic rhinitis due to pollen 5/30/2019      History reviewed. No pertinent surgical history.     Family History   Problem Relation Age of Onset    Asthma Mother     High Cholesterol Father        Social History     Tobacco Use    Smoking status: Never Smoker    Smokeless tobacco: Never Used   Substance Use Topics    Alcohol use: Yes     Comment: rarely      Current Outpatient Medications   Medication Sig Dispense Refill    amoxicillin-clavulanate Left Ear: External ear normal. No decreased hearing noted. A middle ear effusion is present. There is no impacted cerumen. No mastoid tenderness. Nose: Congestion present. Mouth/Throat:      Mouth: Mucous membranes are moist.      Pharynx: No posterior oropharyngeal erythema. Tonsils: No tonsillar exudate. 2+ on the right. 2+ on the left. Eyes:      Extraocular Movements: Extraocular movements intact. Conjunctiva/sclera: Conjunctivae normal.      Pupils: Pupils are equal, round, and reactive to light. Neck:      Musculoskeletal: Normal range of motion. Cardiovascular:      Rate and Rhythm: Normal rate and regular rhythm. Pulses: Normal pulses. Heart sounds: Normal heart sounds. Pulmonary:      Effort: Pulmonary effort is normal.      Breath sounds: Rhonchi present. Abdominal:      General: Abdomen is flat. Bowel sounds are normal.      Palpations: Abdomen is soft. Tenderness: There is no abdominal tenderness. Musculoskeletal:      Right lower leg: No edema. Left lower leg: No edema. Lymphadenopathy:      Cervical: No cervical adenopathy. Skin:     General: Skin is warm. Capillary Refill: Capillary refill takes less than 2 seconds. Neurological:      General: No focal deficit present. Mental Status: He is alert and oriented to person, place, and time. Psychiatric:         Mood and Affect: Mood normal.         Behavior: Behavior normal.       /64 (Site: Left Upper Arm, Position: Sitting)   Pulse 103   Temp 97.7 °F (36.5 °C)   Wt 237 lb (107.5 kg)   SpO2 97%   BMI 36.04 kg/m²     Assessment:       ICD-10-CM    1. Upper respiratory tract infection, unspecified type  J06.9 amoxicillin-clavulanate (AUGMENTIN) 875-125 MG per tablet     cetirizine-psuedoephedrine (ZYRTEC-D) 5-120 MG per extended release tablet   2. Recurrent sinus infections  J32.9 CARL - Gilberto Brewer MD, Otolaryngology, [address] [place] [address]:       1.  Patient given Rx for Augmentin and Zyrtec-D for sinus/upper respiratory infection. I encouraged him to push fluids and use nasal saline rinses. 2. Patient requests a referral to ENT for his chronic sinus issues. Return if symptoms worsen or fail to improve. Orders Placed This Encounter   Procedures   Margaret Rocha MD, Otolaryngology, [place]     Referral Priority:   Routine     Referral Type:   Eval and Treat     Referral Reason:   Specialty Services Required     Referred to Provider:   Bri Jones MD     Requested Specialty:   Otolaryngology     Number of Visits Requested:   1         Patient given educational materials - see patient instructions. Discussed use, benefit, and side effects of prescribedmedications. All patient questions answered. Pt voiced understanding. Reviewed health maintenance. Instructed to continue current medications, diet and exercise. Patient agreed with treatment plan. Follow up as directed.         Electronically signed by Abbie Harris PA-C on 10/21/2020 at 1:07 PM

## 2020-10-21 NOTE — LETTER
[facility] Primary Care  [address] [facility]  Phone: [phone]  Fax: [address] [place], [place]        October 21, 2020     Patient: Candelario Flores   YOB: 1995   Date of Visit: 10/21/2020       To Whom It May Concern: It is my medical opinion that Candelario Flores may return to work on 10/22/2020. If you have any questions or concerns, please don't hesitate to call.     Sincerely,        Germán Schneider PA-C

## 2021-02-01 ENCOUNTER — TELEPHONE (OUTPATIENT)
Dept: PRIMARY CARE CLINIC | Age: 26
End: 2021-02-01

## 2021-02-01 NOTE — TELEPHONE ENCOUNTER
Left message asking patient if he would like to schedule a physical appt with provider, hasn't had one and last appt 6 mo ago with Monica Odonnell

## 2022-07-21 ENCOUNTER — OFFICE VISIT (OUTPATIENT)
Dept: FAMILY MEDICINE CLINIC | Age: 27
End: 2022-07-21
Payer: COMMERCIAL

## 2022-07-21 VITALS
BODY MASS INDEX: 36.37 KG/M2 | HEART RATE: 91 BPM | SYSTOLIC BLOOD PRESSURE: 114 MMHG | RESPIRATION RATE: 20 BRPM | TEMPERATURE: 98.2 F | HEIGHT: 68 IN | OXYGEN SATURATION: 98 % | DIASTOLIC BLOOD PRESSURE: 73 MMHG | WEIGHT: 240 LBS

## 2022-07-21 DIAGNOSIS — R09.82 POST-NASAL DRIP: ICD-10-CM

## 2022-07-21 DIAGNOSIS — H65.93 FLUID LEVEL BEHIND TYMPANIC MEMBRANE OF BOTH EARS: ICD-10-CM

## 2022-07-21 DIAGNOSIS — R05.9 COUGH: Primary | ICD-10-CM

## 2022-07-21 DIAGNOSIS — R06.2 WHEEZING: ICD-10-CM

## 2022-07-21 PROCEDURE — G8417 CALC BMI ABV UP PARAM F/U: HCPCS | Performed by: REGISTERED NURSE

## 2022-07-21 PROCEDURE — G8428 CUR MEDS NOT DOCUMENT: HCPCS | Performed by: REGISTERED NURSE

## 2022-07-21 PROCEDURE — 99213 OFFICE O/P EST LOW 20 MIN: CPT | Performed by: REGISTERED NURSE

## 2022-07-21 PROCEDURE — 1036F TOBACCO NON-USER: CPT | Performed by: REGISTERED NURSE

## 2022-07-21 RX ORDER — BENZONATATE 100 MG/1
200 CAPSULE ORAL 3 TIMES DAILY PRN
Qty: 42 CAPSULE | Refills: 1 | Status: SHIPPED | OUTPATIENT
Start: 2022-07-21 | End: 2022-07-28

## 2022-07-21 RX ORDER — ALBUTEROL SULFATE 90 UG/1
1-2 AEROSOL, METERED RESPIRATORY (INHALATION) EVERY 4 HOURS PRN
Qty: 18 G | Refills: 0 | Status: SHIPPED | OUTPATIENT
Start: 2022-07-21 | End: 2022-10-24

## 2022-07-21 RX ORDER — FLUTICASONE PROPIONATE 50 MCG
2 SPRAY, SUSPENSION (ML) NASAL DAILY
Qty: 16 G | Refills: 3 | Status: SHIPPED | OUTPATIENT
Start: 2022-07-21 | End: 2022-10-24

## 2022-07-21 RX ORDER — CETIRIZINE HYDROCHLORIDE 10 MG/1
10 TABLET ORAL DAILY
Qty: 30 TABLET | Refills: 0 | Status: SHIPPED | OUTPATIENT
Start: 2022-07-21 | End: 2022-08-20

## 2022-07-21 ASSESSMENT — ENCOUNTER SYMPTOMS
DIARRHEA: 0
VOICE CHANGE: 0
VOMITING: 0
WHEEZING: 1
SINUS PRESSURE: 0
COUGH: 1
EYES NEGATIVE: 1
NAUSEA: 0
HEMOPTYSIS: 0
SORE THROAT: 0
SINUS PAIN: 0
TROUBLE SWALLOWING: 0
SHORTNESS OF BREATH: 1
GASTROINTESTINAL NEGATIVE: 1

## 2022-07-21 NOTE — LETTER
[facility]  [address]  [facility] [address]  Phone: [phone]  Fax: [phone]    DAVID Moss CNP        July 22, 2022     Patient: Caro Jolley   YOB: 1995   Date of Visit: 7/21/2022       To Whom it May Concern:    Caro Jolley was seen in my clinic on 7/21/2022. He may return to work on 7/23/2022. If you have any questions or concerns, please don't hesitate to call.     Sincerely,         DAVID Moss CNP

## 2022-07-21 NOTE — PROGRESS NOTES
[facility] WALK-IN  [address] [place] [facility] [address]  [address] [address]  Dept: [phone]  Dept Fax: [phone]    Birgit Casas is a 33 y.o. male who presents today for his medical conditions/complaints of   Chief Complaint   Patient presents with    Cough     Has cough severe at times ~ on and off for years, this last episode went away now. He has no cough today jusy concerned its chronic. Wheezes at times    Migraine     Patient has migraines and when he coughs it can cause migraine to throb          HPI:     /73   Pulse 91   Temp 98.2 °F (36.8 °C) (Temporal)   Resp 20   Ht 5' 8\" (1.727 m)   Wt 240 lb (108.9 kg)   SpO2 98%   BMI 36.49 kg/m²       Cough  This is a recurrent problem. Episode onset: Since he was 13 yrs old. The problem has been gradually worsening. Episode frequency: Some days he will be coughing constantly, other days he he coughs \"three times\" a day. The cough is Non-productive. Associated symptoms include ear pain (Bilateral ear pain x one day), headaches (Intermittent occurs when he is coughing frequently), nasal congestion, postnasal drip, shortness of breath (Intermittently with the cough, states will be SOB 60% of the time when he coughs) and wheezing. Pertinent negatives include no chest pain, chills, hemoptysis, myalgias, sore throat, sweats or weight loss. The symptoms are aggravated by cold air and stress. Risk factors: Works from home, denies chemical exposure. No sick contacts. States he leyva snot smoke. Has not moved homes or started new job. Does not know what is triggering the cough. He has tried OTC cough suppressant for the symptoms. The treatment provided mild relief. His past medical history is significant for environmental allergies. There is no history of asthma (States he has no diagnosis of asthma however he is concenred he may have asthma) or COPD.      Past Medical History:   Diagnosis Date    Seasonal allergic rhinitis due to pollen 5/30/2019        No past surgical history on file. Family History   Problem Relation Age of Onset    Asthma Mother     High Cholesterol Father        Social History     Tobacco Use    Smoking status: Never    Smokeless tobacco: Never   Substance Use Topics    Alcohol use: Yes     Comment: rarely        Prior to Visit Medications    Medication Sig Taking? Authorizing Provider   fluticasone (FLONASE) 50 MCG/ACT nasal spray 2 sprays by Nasal route in the morning. Yes Manasa DIDI Stephanie, APRN - CNP   cetirizine (ZYRTEC) 10 MG tablet Take 1 tablet by mouth in the morning. Yes Manasa A Stephanie, APRN - CNP   albuterol sulfate HFA (PROVENTIL HFA) 108 (90 Base) MCG/ACT inhaler Inhale 1-2 puffs into the lungs every 4 hours as needed for Wheezing or Shortness of Breath (Space out to every 6 hours as symptoms improve) Space out to every 6 hours as symptoms improve. Yes Manasa A Stephanie, APRN - CNP   benzonatate (TESSALON PERLES) 100 MG capsule Take 2 capsules by mouth 3 times daily as needed for Cough Yes Manasa A Stephanie, APRN - CNP       No Known Allergies      Subjective:      Review of Systems   Constitutional:  Negative for chills and weight loss. HENT:  Positive for ear pain (Bilateral ear pain x one day) and postnasal drip. Negative for drooling, ear discharge, hearing loss, sinus pressure, sinus pain, sore throat, trouble swallowing and voice change. Eyes: Negative. Respiratory:  Positive for cough, shortness of breath (Intermittently with the cough, states will be SOB 60% of the time when he coughs) and wheezing. Negative for hemoptysis. Cardiovascular:  Negative for chest pain, palpitations and leg swelling. Gastrointestinal: Negative. Negative for diarrhea, nausea and vomiting. Genitourinary: Negative. Musculoskeletal: Negative. Negative for myalgias. Allergic/Immunologic: Positive for environmental allergies.    Neurological:  Positive for headaches (Intermittent occurs when he is coughing frequently). Psychiatric/Behavioral: Negative. Objective:     Physical Exam  Constitutional:       General: He is not in acute distress. Appearance: Normal appearance. He is normal weight. He is not ill-appearing, toxic-appearing or diaphoretic. HENT:      Right Ear: No drainage, swelling or tenderness. A middle ear effusion is present. Tympanic membrane is not perforated or erythematous. Left Ear: No drainage, swelling or tenderness. A middle ear effusion is present. Tympanic membrane is not perforated or erythematous. Nose: Congestion present. Right Sinus: No maxillary sinus tenderness or frontal sinus tenderness. Left Sinus: No maxillary sinus tenderness or frontal sinus tenderness. Mouth/Throat:      Lips: Pink. No lesions. Pharynx: Uvula midline. No oropharyngeal exudate, posterior oropharyngeal erythema or uvula swelling. Eyes:      Conjunctiva/sclera: Conjunctivae normal.      Pupils: Pupils are equal, round, and reactive to light. Cardiovascular:      Rate and Rhythm: Normal rate and regular rhythm. Heart sounds: Normal heart sounds. Pulmonary:      Effort: Pulmonary effort is normal. No respiratory distress. Breath sounds: Normal breath sounds. No stridor. No wheezing, rhonchi or rales. Skin:     General: Skin is warm. Neurological:      General: No focal deficit present. Mental Status: He is alert. Psychiatric:         Mood and Affect: Mood normal.         MEDICAL DECISION MAKING Assessment/Plan:     Catheryn Barthel was seen today for cough and migraine. Diagnoses and all orders for this visit:    Cough  -     benzonatate (TESSALON PERLES) 100 MG capsule; Take 2 capsules by mouth 3 times daily as needed for Cough    Wheezing  -     albuterol sulfate HFA (PROVENTIL HFA) 108 (90 Base) MCG/ACT inhaler;  Inhale 1-2 puffs into the lungs every 4 hours as needed for Wheezing or Shortness of Breath (Space out to every 6 hours as symptoms improve) Space out to every 6 hours as symptoms improve. Fluid level behind tympanic membrane of both ears  -     fluticasone (FLONASE) 50 MCG/ACT nasal spray; 2 sprays by Nasal route in the morning.  -     cetirizine (ZYRTEC) 10 MG tablet; Take 1 tablet by mouth in the morning. Post-nasal drip  -     fluticasone (FLONASE) 50 MCG/ACT nasal spray; 2 sprays by Nasal route in the morning.  -     cetirizine (ZYRTEC) 10 MG tablet; Take 1 tablet by mouth in the morning. Flonase and Zytrec for PND and middle ear effusion. No signs of AOM or AOE on physical exam.     Explained he needs to make a follow up appointment with his PCP as these symptoms have been present for 10 years. He is concerned he has asthma and needs further evaluation and treatment. The patient does not have clinical findings suggestive of pneumonia. There is no abnormal vital signs (pulse is not greater than 100/ minute, respirations are not greater than 24/ minute, temperature is not greater than 38 degrees Celsius, or oxygen saturation less than 95 percent) There is no tachypnea, rales, or signs of parenchymal consolidation on exam. There are no changes in mental status or behavioral changes. Pt to fill and take medications as prescribed. Rest, increase fluids. Return if no improvement in symptoms. Go to the ER for any emergent concern. Results for orders placed or performed during the hospital encounter of 11/04/19   Throat Culture    Specimen: Throat   Result Value Ref Range    Specimen Description . THROAT     Special Requests NOT REPORTED     Culture       Oral alejo, negative for Group A Strep and other beta hemolytic streptococci       Patient counseled:     Patient given educational materials - see patientinstructions. Discussed use, benefit, and side effects of prescribed medications. All patient questions answered. Pt verbalized understanding.   Instructed to continue current medications, diet and exercise. Patient agreed with treatment plan. Follow up as directed.      Electronically signed by DAVID Acosta CNP on 7/21/2022 at 2:49 PM

## 2022-07-22 ENCOUNTER — TELEPHONE (OUTPATIENT)
Dept: PRIMARY CARE CLINIC | Age: 27
End: 2022-07-22

## 2022-07-22 NOTE — TELEPHONE ENCOUNTER
Spoke with the patient and informed him of the provider's instructions, the patient verbalized understanding of the provider's instructions.

## 2022-07-22 NOTE — LETTER
[facility] Primary Care  [address]  [facility] [address]  Phone: [phone]  Fax: Yuval Bustamante, APRN - CNP        July 22, 2022     Patient: Bassam Ibarra   YOB: 1995   Date of Visit: 7/22/2022       To Whom It May Concern: It is my medical opinion that Bassam Ibarra should remain off of work from 07/21/2022-07/23/2022. If you have any questions or concerns, please don't hesitate to call.     Sincerely,        Taryn Denton, DAVID - CNP

## 2022-07-22 NOTE — TELEPHONE ENCOUNTER
The patient called stating that his work is requiring a doctors note for missed time at work from 07/21/2022-07/23/2022. Letter pended for approval.    The patient would like a call back when the letter is completed, so he can pick it up.

## 2022-10-24 ENCOUNTER — OFFICE VISIT (OUTPATIENT)
Dept: PRIMARY CARE CLINIC | Age: 27
End: 2022-10-24
Payer: COMMERCIAL

## 2022-10-24 VITALS
SYSTOLIC BLOOD PRESSURE: 100 MMHG | HEART RATE: 96 BPM | HEIGHT: 68 IN | BODY MASS INDEX: 36.98 KG/M2 | DIASTOLIC BLOOD PRESSURE: 66 MMHG | OXYGEN SATURATION: 96 % | WEIGHT: 244 LBS

## 2022-10-24 DIAGNOSIS — K80.20 GALLSTONES: Primary | ICD-10-CM

## 2022-10-24 DIAGNOSIS — Z13.0 SCREENING, ANEMIA, DEFICIENCY, IRON: ICD-10-CM

## 2022-10-24 DIAGNOSIS — Z13.1 SCREENING FOR DIABETES MELLITUS (DM): ICD-10-CM

## 2022-10-24 DIAGNOSIS — Z13.6 SCREENING FOR CARDIOVASCULAR CONDITION: ICD-10-CM

## 2022-10-24 PROCEDURE — G8484 FLU IMMUNIZE NO ADMIN: HCPCS | Performed by: NURSE PRACTITIONER

## 2022-10-24 PROCEDURE — G8417 CALC BMI ABV UP PARAM F/U: HCPCS | Performed by: NURSE PRACTITIONER

## 2022-10-24 PROCEDURE — G8427 DOCREV CUR MEDS BY ELIG CLIN: HCPCS | Performed by: NURSE PRACTITIONER

## 2022-10-24 PROCEDURE — 4004F PT TOBACCO SCREEN RCVD TLK: CPT | Performed by: NURSE PRACTITIONER

## 2022-10-24 PROCEDURE — 99214 OFFICE O/P EST MOD 30 MIN: CPT | Performed by: NURSE PRACTITIONER

## 2022-10-24 SDOH — ECONOMIC STABILITY: FOOD INSECURITY: WITHIN THE PAST 12 MONTHS, THE FOOD YOU BOUGHT JUST DIDN'T LAST AND YOU DIDN'T HAVE MONEY TO GET MORE.: NEVER TRUE

## 2022-10-24 SDOH — ECONOMIC STABILITY: FOOD INSECURITY: WITHIN THE PAST 12 MONTHS, YOU WORRIED THAT YOUR FOOD WOULD RUN OUT BEFORE YOU GOT MONEY TO BUY MORE.: NEVER TRUE

## 2022-10-24 ASSESSMENT — PATIENT HEALTH QUESTIONNAIRE - PHQ9
SUM OF ALL RESPONSES TO PHQ QUESTIONS 1-9: 0
2. FEELING DOWN, DEPRESSED OR HOPELESS: 0
SUM OF ALL RESPONSES TO PHQ9 QUESTIONS 1 & 2: 0
SUM OF ALL RESPONSES TO PHQ QUESTIONS 1-9: 0
SUM OF ALL RESPONSES TO PHQ QUESTIONS 1-9: 0
1. LITTLE INTEREST OR PLEASURE IN DOING THINGS: 0
SUM OF ALL RESPONSES TO PHQ QUESTIONS 1-9: 0

## 2022-10-24 ASSESSMENT — SOCIAL DETERMINANTS OF HEALTH (SDOH): HOW HARD IS IT FOR YOU TO PAY FOR THE VERY BASICS LIKE FOOD, HOUSING, MEDICAL CARE, AND HEATING?: NOT HARD AT ALL

## 2022-10-24 ASSESSMENT — ENCOUNTER SYMPTOMS
CHEST TIGHTNESS: 0
NAUSEA: 0
VOMITING: 0
ABDOMINAL PAIN: 0
RHINORRHEA: 0
DIARRHEA: 0
COLOR CHANGE: 0
SORE THROAT: 0
SHORTNESS OF BREATH: 0

## 2022-10-24 NOTE — PROGRESS NOTES
[facility] PRIMARY CARE  [address] [facility] [address]  [address] [address]  Dept: [phone]  Dept Fax: [phone]    Robyn Barr is a 33 y.o. male who presentstoday for his medical conditions/complaints as noted below. Robyn Barr is c/o of  Chief Complaint   Patient presents with    Follow-up     Gallstones shown on imaging           HPI:     Here for ED follow up for RLQ abdominal pain  Saw at [facility] 10/8/22, reports pain is improved   CTAP showed cholelithiasis without cholecystitis  He has been eating bland diet with improvement   Denies any bowel or bladder dysfunction    No other concerns or complaints     Due for screening labs      No results found for: LABA1C          ( goal A1C is < 7)   No results found for: LABMICR  No results found for: LDLCHOLESTEROL, LDLCALC    (goal LDL is <100)   No results found for: AST, ALT, BUN, CR  BP Readings from Last 3 Encounters:   10/24/22 100/66   22 114/73   10/21/20 114/64          ([id number])    Past Medical History:   Diagnosis Date    Seasonal allergic rhinitis due to pollen 2019      No past surgical history on file. Family History   Problem Relation Age of Onset    Asthma Mother     High Cholesterol Father        Social History     Tobacco Use    Smoking status: Never    Smokeless tobacco: Never   Substance Use Topics    Alcohol use: Yes     Comment: rarely      No current outpatient medications on file. No current facility-administered medications for this visit.      No Known Allergies    Health Maintenance   Topic Date Due    Depression Screen  Never done    Varicella vaccine (2 of 2 - 13+ 2-dose series) 2010    DTaP/Tdap/Td vaccine (7 - Td or Tdap) 2020    Hepatitis C screen  10/25/2022 (Originally 2014)    HPV vaccine (1 - Male 2-dose series) 10/24/2023 (Originally 2007)    Flu vaccine (1) 10/24/2023 (Originally 2022)    HIV screen  10/24/2023 (Originally 2011) COVID-19 Vaccine (1) 01/01/2024 (Originally 1996)    Hepatitis A vaccine  Completed    Hib vaccine  Completed    Meningococcal (ACWY) vaccine  Aged Out    Pneumococcal 0-64 years Vaccine  Aged Out       Subjective:      Review of Systems   Constitutional:  Negative for activity change, fatigue and fever. HENT:  Negative for congestion, rhinorrhea and sore throat. Eyes:  Negative for visual disturbance. Respiratory:  Negative for chest tightness and shortness of breath. Cardiovascular:  Negative for chest pain and palpitations. Gastrointestinal:  Negative for abdominal pain, diarrhea, nausea and vomiting. Endocrine: Negative for polydipsia. Genitourinary:  Negative for difficulty urinating. Musculoskeletal:  Negative for arthralgias and myalgias. Skin:  Negative for color change. Neurological:  Negative for weakness and headaches. Psychiatric/Behavioral:  Negative for behavioral problems. The patient is not nervous/anxious. All other systems reviewed and are negative. Objective:   /66   Pulse 96   Ht 5' 8\" (1.727 m)   Wt 244 lb (110.7 kg)   SpO2 96%   BMI 37.10 kg/m²   Physical Exam  Vitals reviewed. Constitutional:       General: He is not in acute distress. Appearance: Normal appearance. HENT:      Head: Normocephalic. Eyes:      Pupils: Pupils are equal, round, and reactive to light. Cardiovascular:      Rate and Rhythm: Normal rate and regular rhythm. Pulses: Normal pulses. Heart sounds: Normal heart sounds. Pulmonary:      Effort: Pulmonary effort is normal.      Breath sounds: Normal breath sounds. Abdominal:      General: There is no distension. Palpations: Abdomen is soft. Tenderness: There is no abdominal tenderness. Musculoskeletal:         General: Normal range of motion. Right lower leg: No edema. Left lower leg: No edema. Skin:     General: Skin is warm and dry.       Capillary Refill: Capillary refill takes less than 2 seconds. Neurological:      General: No focal deficit present. Mental Status: He is alert and oriented to person, place, and time. Psychiatric:         Mood and Affect: Mood normal.         Behavior: Behavior normal.         :       Diagnosis Orders   1. Gallstones        2. Screening, anemia, deficiency, iron  CBC with Auto Differential      3. Screening for diabetes mellitus (DM)  Comprehensive Metabolic Panel      4. Screening for cardiovascular condition  Lipid Panel                :          1. Gallstones  Pain resolved, has intermittent dull pain but is short lived. Encouraged to f/u as needed for new or worsening symptoms. Gallbladder US and HIDA if needed. Continue low fat diet and keeping log of food triggers. Reviewed ED encounter in care everywhere and discussed. 2. Screening, anemia, deficiency, iron  - CBC with Auto Differential; Future    3. Screening for diabetes mellitus (DM)  - Comprehensive Metabolic Panel; Future    4. Screening for cardiovascular condition  - Lipid Panel; Future    Return if symptoms worsen or fail to improve. Patient given educational materials - see patient instructions. Discussed use, benefit, and side effects of prescribed medications. All patient questions answered. Pt voiced understanding. Reviewed health maintenance. Instructed to continue current medications, diet and exercise. Patient agreed with treatment plan. Follow up as directed.        Electronicallysigned by DAVID Sosa CNP on 10/24/2022 at 11:50 AM

## 2023-01-23 ENCOUNTER — OFFICE VISIT (OUTPATIENT)
Dept: FAMILY MEDICINE CLINIC | Age: 28
End: 2023-01-23
Payer: COMMERCIAL

## 2023-01-23 VITALS
RESPIRATION RATE: 16 BRPM | SYSTOLIC BLOOD PRESSURE: 134 MMHG | DIASTOLIC BLOOD PRESSURE: 82 MMHG | WEIGHT: 246 LBS | BODY MASS INDEX: 37.4 KG/M2 | TEMPERATURE: 98.2 F | OXYGEN SATURATION: 98 % | HEART RATE: 94 BPM

## 2023-01-23 DIAGNOSIS — R13.10 PAINFUL SWALLOWING: ICD-10-CM

## 2023-01-23 DIAGNOSIS — J03.90 TONSILLITIS: Primary | ICD-10-CM

## 2023-01-23 DIAGNOSIS — U07.1 COVID-19: ICD-10-CM

## 2023-01-23 LAB — S PYO AG THROAT QL: NORMAL

## 2023-01-23 PROCEDURE — 99214 OFFICE O/P EST MOD 30 MIN: CPT | Performed by: NURSE PRACTITIONER

## 2023-01-23 PROCEDURE — 87880 STREP A ASSAY W/OPTIC: CPT | Performed by: NURSE PRACTITIONER

## 2023-01-23 RX ORDER — AZITHROMYCIN 250 MG/1
TABLET, FILM COATED ORAL
Qty: 1 PACKET | Refills: 0 | Status: SHIPPED | OUTPATIENT
Start: 2023-01-23 | End: 2023-02-02

## 2023-01-23 RX ORDER — PREDNISONE 20 MG/1
20 TABLET ORAL 2 TIMES DAILY
Qty: 10 TABLET | Refills: 0 | Status: SHIPPED | OUTPATIENT
Start: 2023-01-23 | End: 2023-01-28

## 2023-01-23 ASSESSMENT — ENCOUNTER SYMPTOMS
VOMITING: 0
RHINORRHEA: 1
EYE PAIN: 0
CHEST TIGHTNESS: 0
COUGH: 1
NAUSEA: 0
SHORTNESS OF BREATH: 0
VOICE CHANGE: 0
SORE THROAT: 1
TROUBLE SWALLOWING: 0

## 2023-01-23 ASSESSMENT — PATIENT HEALTH QUESTIONNAIRE - PHQ9
1. LITTLE INTEREST OR PLEASURE IN DOING THINGS: 0
SUM OF ALL RESPONSES TO PHQ QUESTIONS 1-9: 0
SUM OF ALL RESPONSES TO PHQ9 QUESTIONS 1 & 2: 0
SUM OF ALL RESPONSES TO PHQ QUESTIONS 1-9: 0
2. FEELING DOWN, DEPRESSED OR HOPELESS: 0
SUM OF ALL RESPONSES TO PHQ QUESTIONS 1-9: 0
SUM OF ALL RESPONSES TO PHQ QUESTIONS 1-9: 0

## 2023-01-23 NOTE — PROGRESS NOTES
[address] [facility] WALK-IN  [address] [facility] [address]  [address] [address]  Dept: [phone]  Dept Fax: [phone]    Julián Wong is a 33 y.o. male who presents today for his medical conditions/complaints of   Chief Complaint   Patient presents with    Post-COVID Symptoms               HPI:     /82   Pulse 94   Temp 98.2 °F (36.8 °C)   Resp 16   Wt 246 lb (111.6 kg)   SpO2 98%   BMI 37.40 kg/m²       HPI  COVID-19 positive on 2023. Pt presented to the clinic today with c/o sore throat. This is a new problem. The current episode started 2 days ago. Pain is worse with swallowing. The problem has been unchanged since onset. Associated symptoms include: fever- temp 101.0- has not had fever today, runny nose, congestion, post nasal drip, cough- productive . Pertinent negatives include: No SOB, chest pain, abdominal pain, wheezing, drooling, trouble swallowing . Pt has tried Tylenol and Motrin with little improvement. History of tonsillitis- has been referred to ENT in the past.     Past Medical History:   Diagnosis Date    Seasonal allergic rhinitis due to pollen 2019        No past surgical history on file. Family History   Problem Relation Age of Onset    Asthma Mother     High Cholesterol Father        Social History     Tobacco Use    Smoking status: Never    Smokeless tobacco: Never   Substance Use Topics    Alcohol use: Yes     Comment: rarely        Prior to Visit Medications    Medication Sig Taking? Authorizing Provider   azithromycin (ZITHROMAX) 250 MG tablet Take 2 tablets (500 mg) on Day 1, followed by 1 tablet (250 mg) once daily on Days 2 through 5.  Yes DAVID Olson CNP   predniSONE (DELTASONE) 20 MG tablet Take 1 tablet by mouth 2 times daily for 5 days Yes DAVID Olson CNP       No Known Allergies      Subjective:      Review of Systems   Constitutional:  Positive for fatigue and fever. Negative for chills. HENT:  Positive for congestion, postnasal drip, rhinorrhea and sore throat. Negative for ear pain, trouble swallowing and voice change. Eyes:  Negative for pain and visual disturbance. Respiratory:  Positive for cough. Negative for chest tightness and shortness of breath. Cardiovascular:  Negative for chest pain, palpitations and leg swelling. Gastrointestinal:  Negative for nausea and vomiting. Genitourinary:  Negative for decreased urine volume and difficulty urinating. Musculoskeletal:  Negative for gait problem, myalgias and neck pain. Skin:  Negative for pallor and rash. Neurological:  Negative for weakness, light-headedness and headaches. Psychiatric/Behavioral:  Negative for sleep disturbance. Objective:     Physical Exam  Vitals and nursing note reviewed. Constitutional:       General: He is not in acute distress. Appearance: Normal appearance. HENT:      Head: Normocephalic and atraumatic. Jaw: No trismus. Right Ear: Tympanic membrane and ear canal normal.      Left Ear: Tympanic membrane and ear canal normal.      Nose: Congestion and rhinorrhea present. Mouth/Throat:      Lips: Pink. Mouth: Mucous membranes are moist.      Pharynx: Oropharynx is clear. Uvula midline. Posterior oropharyngeal erythema present. Tonsils: No tonsillar exudate. 1+ on the right. 2+ on the left. Comments: Voice clear and not muffled. No trismus. Uvula midline. No trouble swallowing or drooling. Handling secretions with no issues. Eyes:      Extraocular Movements: Extraocular movements intact. Conjunctiva/sclera: Conjunctivae normal.      Pupils: Pupils are equal, round, and reactive to light. Cardiovascular:      Rate and Rhythm: Normal rate and regular rhythm. Pulses: Normal pulses. Pulmonary:      Effort: Pulmonary effort is normal. No tachypnea. Breath sounds: Normal breath sounds.    Abdominal: General: Bowel sounds are normal.      Palpations: Abdomen is soft. Musculoskeletal:         General: Normal range of motion. Cervical back: Normal range of motion and neck supple. Skin:     General: Skin is warm and dry. Capillary Refill: Capillary refill takes less than 2 seconds. Coloration: Skin is not pale. Neurological:      Mental Status: He is alert and oriented to person, place, and time. Coordination: Coordination normal.      Gait: Gait normal.   Psychiatric:         Mood and Affect: Mood normal.         Thought Content: Thought content normal.         MEDICAL DECISION MAKING Assessment/Plan:     Virginia Aguilera was seen today for post-covid symptoms. Diagnoses and all orders for this visit:    Tonsillitis  -     azithromycin (ZITHROMAX) 250 MG tablet; Take 2 tablets (500 mg) on Day 1, followed by 1 tablet (250 mg) once daily on Days 2 through 5. Painful swallowing  -     POCT rapid strep A  -     predniSONE (DELTASONE) 20 MG tablet; Take 1 tablet by mouth 2 times daily for 5 days    COVID-19  -     predniSONE (DELTASONE) 20 MG tablet; Take 1 tablet by mouth 2 times daily for 5 days      Results for orders placed or performed in visit on 01/23/23   POCT rapid strep A   Result Value Ref Range    Strep A Ag None Detected None Detected     Rapid strep A in the office was negative. COVID-19 positive on 1/19/2023. Sore throat started 2 days ago. Fever resolved today. Based on the history and exam, will treat as tonsillitis with Z-pack and oral prednisone. No signs of peritonsillar abscess on exam.   Push fluids. Pt to return if symptoms are not improving or worsening. Go to the ER for any emergent concern. Work note provided. Patient given educational materials - see patientinstructions. Discussed use, benefit, and side effects of prescribed medications. All patient questions answered. Pt verbalized understanding.   Instructed to continue current medications, diet and exercise. Patient agreed with treatment plan. Follow up as directed.      Electronically signed by DAVID Leblanc CNP on 1/23/2023 at 11:48 AM

## 2023-01-23 NOTE — LETTER
[address] [facility]  [address] [place] [address] [address] [address]  Phone: [phone]  Fax: [phone] , APRN - CNP        January 23, 2023     Patient: Shekhar Mo   YOB: 1995   Date of Visit: 1/23/2023       To Whom it May Concern:    Shekhar Mo was seen in my clinic on 1/23/2023. He may return to work on 1/27/2023. .    If you have any questions or concerns, please don't hesitate to call.     Sincerely,         Jon Gonzalez, DAVID - CNP